# Patient Record
(demographics unavailable — no encounter records)

---

## 2025-07-14 NOTE — ASSESSMENT
[FreeTextEntry1] : Impression: Fracture left distal radius.  This child's been placed into a molded short arm fiberglass cast uneventfully waterproof in nature as per mother's request.  I discussed cast care and activities with mom.  He can swim but no running and jumping activities she will return in 3 weeks for x-rays of the left wrist and likely removal of the cast at that time

## 2025-07-14 NOTE — HISTORY OF PRESENT ILLNESS
[FreeTextEntry1] : This 4-year-old healthy child with normal development is seen for evaluation of his left wrist region.  He was well until just recently 2 days ago when he fell from a trampoline sustaining injury.  He was seen at urgent care center x-rays were taken and he was sent out in a splint after x-rays revealed a fracture.  He still is uncomfortable.  Prior to this no complaints past medical history is noncontributory

## 2025-07-14 NOTE — PHYSICAL EXAM
[FreeTextEntry1] : Examination today with the splint removed reveals obvious swelling and tenderness to the distal radius without clinical deformity.  The elbow is nontender moves well compartments are soft neurovascular status is intact.  Written results of x-rays from the urgent care center revealed a nondisplaced distal radius fracture